# Patient Record
Sex: MALE | Race: WHITE | NOT HISPANIC OR LATINO | URBAN - METROPOLITAN AREA
[De-identification: names, ages, dates, MRNs, and addresses within clinical notes are randomized per-mention and may not be internally consistent; named-entity substitution may affect disease eponyms.]

---

## 2020-07-25 ENCOUNTER — TELEPHONE ENCOUNTER (OUTPATIENT)
Dept: URBAN - METROPOLITAN AREA CLINIC 13 | Facility: CLINIC | Age: 45
End: 2020-07-25

## 2020-07-26 ENCOUNTER — TELEPHONE ENCOUNTER (OUTPATIENT)
Dept: URBAN - METROPOLITAN AREA CLINIC 13 | Facility: CLINIC | Age: 45
End: 2020-07-26

## 2023-01-17 ENCOUNTER — TELEPHONE ENCOUNTER (OUTPATIENT)
Dept: URBAN - METROPOLITAN AREA CLINIC 113 | Facility: CLINIC | Age: 48
End: 2023-01-17

## 2023-01-20 ENCOUNTER — OFFICE VISIT (OUTPATIENT)
Dept: URBAN - METROPOLITAN AREA CLINIC 113 | Facility: CLINIC | Age: 48
End: 2023-01-20
Payer: COMMERCIAL

## 2023-01-20 ENCOUNTER — WEB ENCOUNTER (OUTPATIENT)
Dept: URBAN - METROPOLITAN AREA CLINIC 113 | Facility: CLINIC | Age: 48
End: 2023-01-20

## 2023-01-20 ENCOUNTER — TELEPHONE ENCOUNTER (OUTPATIENT)
Dept: URBAN - METROPOLITAN AREA CLINIC 113 | Facility: CLINIC | Age: 48
End: 2023-01-20

## 2023-01-20 VITALS
BODY MASS INDEX: 42.66 KG/M2 | HEIGHT: 72 IN | WEIGHT: 315 LBS | DIASTOLIC BLOOD PRESSURE: 85 MMHG | SYSTOLIC BLOOD PRESSURE: 138 MMHG | TEMPERATURE: 98 F | HEART RATE: 89 BPM

## 2023-01-20 DIAGNOSIS — E66.01 MORBID (SEVERE) OBESITY DUE TO EXCESS CALORIES: ICD-10-CM

## 2023-01-20 DIAGNOSIS — K75.81 NASH (NONALCOHOLIC STEATOHEPATITIS): ICD-10-CM

## 2023-01-20 DIAGNOSIS — K52.9 COLITIS: ICD-10-CM

## 2023-01-20 PROCEDURE — 99244 OFF/OP CNSLTJ NEW/EST MOD 40: CPT | Performed by: INTERNAL MEDICINE

## 2023-01-20 PROCEDURE — 99204 OFFICE O/P NEW MOD 45 MIN: CPT | Performed by: INTERNAL MEDICINE

## 2023-01-20 RX ORDER — LOSARTAN POTASSIUM 100 MG/1
1 TABLET TABLET ORAL ONCE A DAY
Status: ACTIVE | COMMUNITY

## 2023-01-20 RX ORDER — CHOLESTYRAMINE 4 G/9G
1 PACKET MIXED WITH WATER OR NON-CARBONATED DRINK POWDER, FOR SUSPENSION ORAL ONCE A DAY
Status: ACTIVE | COMMUNITY

## 2023-01-20 NOTE — HPI-TODAY'S VISIT:
47-year-old male presenting for follow-up.  He was seen in our clinic previously for elevated liver enzymes and hepatic steatosis. He did recently have labwork and imaging performed last primary care physician.  He had an abdominal ultrasound with [facility] Wave which demonstrated an enlarged liver with diffuse echotexture.  His exam was limited secondary to body habitus. Platelet count is 104.  His hemoglobin is currently normal.  He does have an elevated AST of 70. She also had a CT scan performed in November 2022 for abdominal cramping.  He was found to have questionable distal descending colon and proximal sigmoid colonic thickening.  There was a question for descending and sigmoid colitis. He did have a fibrosis score of 0.58 and steatosis score of 0.36. He was feeling ill after takin Ozempic. He was scheduled for an EGD and colonoscopy but was canceled. He has not had any ETOH since October 2022.

## 2023-02-15 ENCOUNTER — WEB ENCOUNTER (OUTPATIENT)
Dept: URBAN - METROPOLITAN AREA CLINIC 113 | Facility: CLINIC | Age: 48
End: 2023-02-15

## 2023-02-16 ENCOUNTER — TELEPHONE ENCOUNTER (OUTPATIENT)
Dept: URBAN - METROPOLITAN AREA CLINIC 113 | Facility: CLINIC | Age: 48
End: 2023-02-16

## 2023-02-16 RX ORDER — SODIUM PICOSULFATE, MAGNESIUM OXIDE, AND ANHYDROUS CITRIC ACID 10; 3.5; 12 MG/160ML; G/160ML; G/160ML
160 ML LIQUID ORAL
Qty: 320 MILLILITER | Refills: 0 | OUTPATIENT
Start: 2023-02-16 | End: 2023-02-17

## 2023-02-16 RX ORDER — LOSARTAN POTASSIUM 100 MG/1
1 TABLET TABLET ORAL ONCE A DAY
Status: ACTIVE | COMMUNITY

## 2023-02-16 RX ORDER — CHOLESTYRAMINE 4 G/9G
1 PACKET MIXED WITH WATER OR NON-CARBONATED DRINK POWDER, FOR SUSPENSION ORAL ONCE A DAY
Status: ACTIVE | COMMUNITY

## 2023-02-21 ENCOUNTER — OFFICE VISIT (OUTPATIENT)
Dept: URBAN - METROPOLITAN AREA MEDICAL CENTER 2 | Facility: MEDICAL CENTER | Age: 48
End: 2023-02-21
Payer: COMMERCIAL

## 2023-02-21 DIAGNOSIS — R93.3 ABN FINDINGS-GI TRACT: ICD-10-CM

## 2023-02-21 DIAGNOSIS — D12.4 ADENOMA OF DESCENDING COLON: ICD-10-CM

## 2023-02-21 PROCEDURE — 45385 COLONOSCOPY W/LESION REMOVAL: CPT | Performed by: INTERNAL MEDICINE

## 2023-02-21 RX ORDER — CHOLESTYRAMINE 4 G/9G
1 PACKET MIXED WITH WATER OR NON-CARBONATED DRINK POWDER, FOR SUSPENSION ORAL ONCE A DAY
Status: ACTIVE | COMMUNITY

## 2023-02-21 RX ORDER — LOSARTAN POTASSIUM 100 MG/1
1 TABLET TABLET ORAL ONCE A DAY
Status: ACTIVE | COMMUNITY

## 2023-03-08 ENCOUNTER — OFFICE VISIT (OUTPATIENT)
Dept: URBAN - METROPOLITAN AREA CLINIC 113 | Facility: CLINIC | Age: 48
End: 2023-03-08
Payer: COMMERCIAL

## 2023-03-08 ENCOUNTER — TELEPHONE ENCOUNTER (OUTPATIENT)
Dept: URBAN - METROPOLITAN AREA CLINIC 113 | Facility: CLINIC | Age: 48
End: 2023-03-08

## 2023-03-08 ENCOUNTER — WEB ENCOUNTER (OUTPATIENT)
Dept: URBAN - METROPOLITAN AREA CLINIC 113 | Facility: CLINIC | Age: 48
End: 2023-03-08

## 2023-03-08 ENCOUNTER — OFFICE VISIT (OUTPATIENT)
Dept: URBAN - METROPOLITAN AREA SURGERY CENTER 25 | Facility: SURGERY CENTER | Age: 48
End: 2023-03-08

## 2023-03-08 VITALS
HEART RATE: 84 BPM | TEMPERATURE: 97.8 F | BODY MASS INDEX: 42.66 KG/M2 | HEIGHT: 72 IN | WEIGHT: 315 LBS | DIASTOLIC BLOOD PRESSURE: 84 MMHG | RESPIRATION RATE: 18 BRPM | SYSTOLIC BLOOD PRESSURE: 168 MMHG

## 2023-03-08 DIAGNOSIS — K75.81 NASH (NONALCOHOLIC STEATOHEPATITIS): ICD-10-CM

## 2023-03-08 DIAGNOSIS — K52.9 COLITIS: ICD-10-CM

## 2023-03-08 DIAGNOSIS — E66.01 MORBID (SEVERE) OBESITY DUE TO EXCESS CALORIES: ICD-10-CM

## 2023-03-08 DIAGNOSIS — D12.6 TUBULAR ADENOMA OF COLON: ICD-10-CM

## 2023-03-08 PROBLEM — 408512008: Status: ACTIVE | Noted: 2023-01-20

## 2023-03-08 PROBLEM — 442685003: Status: ACTIVE | Noted: 2023-01-20

## 2023-03-08 PROBLEM — 83911000119104: Status: ACTIVE | Noted: 2023-01-20

## 2023-03-08 PROCEDURE — 99214 OFFICE O/P EST MOD 30 MIN: CPT

## 2023-03-08 RX ORDER — LOSARTAN POTASSIUM 100 MG/1
1 TABLET TABLET ORAL ONCE A DAY
Status: ACTIVE | COMMUNITY

## 2023-03-08 RX ORDER — CHOLESTYRAMINE 4 G/9G
1 PACKET MIXED WITH WATER OR NON-CARBONATED DRINK POWDER, FOR SUSPENSION ORAL ONCE A DAY
Status: ACTIVE | COMMUNITY

## 2023-03-08 NOTE — HPI-TODAY'S VISIT:
47-year-old male presents for follow-up.  He was last seen on 1/20/2023.  There was concern for progressive liver disease given his thrombocytopenia and history of fibrosis.  He was recommended a liver biopsy.  He did also have colitis seen on recent CT scan.  He was recommended a colonoscopy.  Colonoscopy 2/21/2023:Performed without difficulty.  BBPS score of 7.  Grade 2 nonbleeding internal hemorrhoids.  Normal mucosa throughout the entire colon.  Removal of four 6 to 8 mm tubular adenomas in the descending colon.  Distal rectum and anal verge are normal.  Repeat in 3 years for surveillance. We do not have liver biopsy for review.   He states he was miserable on Ozempic. THis causes abdominal pain, nausea, vomiting and diarrhea. He stopped the medication on his own accord. He has not had symptoms since then. He admits to inconsistent stool form. His stool "breaks off" and has to wipe often. His stools are otherwise normal. Denies diarrhea. He has had small volume blood per rectum on the toilet commode a few months ago. He takes Cholestyramine for his bowels after his cholecystectomy. He has yet to perform the liver biopsy. He has a bachelor party for his son and his wedding in April. He would like to schedule this afterward. He understands he should drink ETOH.   1/20/2023: 47-year-old male presenting for follow-up.  He was seen in our clinic previously for elevated liver enzymes and hepatic steatosis. He did recently have lab work and imaging performed last primary care physician.  He had an abdominal ultrasound with Shear Wave which demonstrated an enlarged liver with diffuse echotexture.  His exam was limited secondary to body habitus. Platelet count is 104.  His hemoglobin is currently normal.  He does have an elevated AST of 70. She also had a CT scan performed in November 2022 for abdominal cramping.  He was found to have questionable distal descending colon and proximal sigmoid colonic thickening.  There was a question for descending and sigmoid colitis. He did have a fibrosis score of 0.58 and steatosis score of 0.36. He was feeling ill after taking Ozempic. He was scheduled for an EGD and colonoscopy but was canceled. He has not had any ETOH since October 2022.

## 2023-03-21 ENCOUNTER — OFFICE VISIT (OUTPATIENT)
Dept: URBAN - METROPOLITAN AREA CLINIC 113 | Facility: CLINIC | Age: 48
End: 2023-03-21

## 2023-06-06 ENCOUNTER — TELEPHONE ENCOUNTER (OUTPATIENT)
Dept: URBAN - METROPOLITAN AREA CLINIC 113 | Facility: CLINIC | Age: 48
End: 2023-06-06

## 2023-06-09 ENCOUNTER — OFFICE VISIT (OUTPATIENT)
Dept: URBAN - METROPOLITAN AREA CLINIC 113 | Facility: CLINIC | Age: 48
End: 2023-06-09

## 2023-06-16 ENCOUNTER — DASHBOARD ENCOUNTERS (OUTPATIENT)
Age: 48
End: 2023-06-16

## 2023-06-16 ENCOUNTER — WEB ENCOUNTER (OUTPATIENT)
Dept: URBAN - METROPOLITAN AREA CLINIC 113 | Facility: CLINIC | Age: 48
End: 2023-06-16

## 2023-06-16 ENCOUNTER — OFFICE VISIT (OUTPATIENT)
Dept: URBAN - METROPOLITAN AREA CLINIC 113 | Facility: CLINIC | Age: 48
End: 2023-06-16
Payer: COMMERCIAL

## 2023-06-16 VITALS
RESPIRATION RATE: 18 BRPM | HEART RATE: 84 BPM | DIASTOLIC BLOOD PRESSURE: 91 MMHG | BODY MASS INDEX: 42.66 KG/M2 | WEIGHT: 315 LBS | TEMPERATURE: 97.5 F | SYSTOLIC BLOOD PRESSURE: 147 MMHG | HEIGHT: 72 IN

## 2023-06-16 DIAGNOSIS — K52.89 (LYMPHOCYTIC) MICROSCOPIC COLITIS: ICD-10-CM

## 2023-06-16 DIAGNOSIS — D12.6 TUBULAR ADENOMA OF COLON: ICD-10-CM

## 2023-06-16 DIAGNOSIS — K75.81 NASH (NONALCOHOLIC STEATOHEPATITIS): ICD-10-CM

## 2023-06-16 DIAGNOSIS — K74.69 OTHER CIRRHOSIS OF LIVER: ICD-10-CM

## 2023-06-16 DIAGNOSIS — E66.01 MORBID (SEVERE) OBESITY DUE TO EXCESS CALORIES: ICD-10-CM

## 2023-06-16 PROBLEM — 19943007: Status: ACTIVE | Noted: 2023-06-16

## 2023-06-16 PROCEDURE — 99214 OFFICE O/P EST MOD 30 MIN: CPT | Performed by: INTERNAL MEDICINE

## 2023-06-16 RX ORDER — LOSARTAN POTASSIUM 100 MG/1
1 TABLET TABLET ORAL ONCE A DAY
Status: ACTIVE | COMMUNITY

## 2023-06-16 RX ORDER — CHOLESTYRAMINE 4 G/9G
1 PACKET MIXED WITH WATER OR NON-CARBONATED DRINK POWDER, FOR SUSPENSION ORAL ONCE A DAY
Status: ACTIVE | COMMUNITY

## 2023-06-16 NOTE — HPI-TODAY'S VISIT:
49 y/o male presenting for f/u. He was last seen in March 2023. He has a hx of fatty liver and liver bx shows cirrhosis. He is not drinking ETOH. He has not drank since 2022.  Patient is without any abdominal complaints. No dysphagia, heartburn, regurgitation, unintentional weight loss, nausea, vomiting, hematemesis or melena. Bowels are moving regularly without blood per rectum. No complaints of bloating. No jaundice, icterus.  3/8/23 47-year-old male presents for follow-up.  He was last seen on 1/20/2023.  There was concern for progressive liver disease given his thrombocytopenia and history of fibrosis.  He was recommended a liver biopsy.  He did also have colitis seen on recent CT scan.  He was recommended a colonoscopy.  Colonoscopy 2/21/2023:Performed without difficulty.  BBPS score of 7.  Grade 2 nonbleeding internal hemorrhoids.  Normal mucosa throughout the entire colon.  Removal of four 6 to 8 mm tubular adenomas in the descending colon.  Distal rectum and anal verge are normal.  Repeat in 3 years for surveillance. We do not have liver biopsy for review.   He states he was miserable on Ozempic. THis causes abdominal pain, nausea, vomiting and diarrhea. He stopped the medication on his own accord. He has not had symptoms since then. He admits to inconsistent stool form. His stool "breaks off" and has to wipe often. His stools are otherwise normal. Denies diarrhea. He has had small volume blood per rectum on the toilet commode a few months ago. He takes Cholestyramine for his bowels after his cholecystectomy. He has yet to perform the liver biopsy. He has a bachelor party for his son and his wedding in April. He would like to schedule this afterward. He understands he should drink ETOH.   1/20/2023: 47-year-old male presenting for follow-up.  He was seen in our clinic previously for elevated liver enzymes and hepatic steatosis. He did recently have lab work and imaging performed last primary care physician.  He had an abdominal ultrasound with Shear Wave which demonstrated an enlarged liver with diffuse echotexture.  His exam was limited secondary to body habitus. Platelet count is 104.  His hemoglobin is currently normal.  He does have an elevated AST of 70. She also had a CT scan performed in November 2022 for abdominal cramping.  He was found to have questionable distal descending colon and proximal sigmoid colonic thickening.  There was a question for descending and sigmoid colitis. He did have a fibrosis score of 0.58 and steatosis score of 0.36. He was feeling ill after taking Ozempic. He was scheduled for an EGD and colonoscopy but was canceled. He has not had any ETOH since October 2022.

## 2023-06-19 LAB
A/G RATIO: 1.2
ABSOLUTE BASOPHILS: 18
ABSOLUTE EOSINOPHILS: 142
ABSOLUTE LYMPHOCYTES: 1322
ABSOLUTE MONOCYTES: 425
ABSOLUTE NEUTROPHILS: 3994
AFP, SERUM, TUMOR MARKER: 4.8
ALBUMIN: 3.6
ALKALINE PHOSPHATASE: 108
ALT (SGPT): 31
AST (SGOT): 38
BASOPHILS: 0.3
BILIRUBIN, TOTAL: 0.7
BUN/CREATININE RATIO: 14
BUN: 7
CALCIUM: 9
CARBON DIOXIDE, TOTAL: 29
CHLORIDE: 104
CREATININE: 0.5
EGFR: 127
EOSINOPHILS: 2.4
GLOBULIN, TOTAL: 3.1
GLUCOSE: 110
HEMATOCRIT: 41.5
HEMOGLOBIN: 14.5
INR: 1.1
LYMPHOCYTES: 22.4
MCH: 33.2
MCHC: 34.9
MCV: 95
MONOCYTES: 7.2
MPV: 10.2
NEUTROPHILS: 67.7
PLATELET COUNT: 159
POTASSIUM: 3.7
PROTEIN, TOTAL: 6.7
PT: 11.6
RDW: 12.3
RED BLOOD CELL COUNT: 4.37
SODIUM: 139
WHITE BLOOD CELL COUNT: 5.9

## 2023-07-12 ENCOUNTER — OFFICE VISIT (OUTPATIENT)
Dept: URBAN - METROPOLITAN AREA MEDICAL CENTER 2 | Facility: MEDICAL CENTER | Age: 48
End: 2023-07-12

## 2023-07-12 RX ORDER — LOSARTAN POTASSIUM 100 MG/1
1 TABLET TABLET ORAL ONCE A DAY
Status: ACTIVE | COMMUNITY

## 2023-07-12 RX ORDER — CHOLESTYRAMINE 4 G/9G
1 PACKET MIXED WITH WATER OR NON-CARBONATED DRINK POWDER, FOR SUSPENSION ORAL ONCE A DAY
Status: ACTIVE | COMMUNITY

## 2023-07-31 ENCOUNTER — OFFICE VISIT (OUTPATIENT)
Dept: URBAN - METROPOLITAN AREA CLINIC 113 | Facility: CLINIC | Age: 48
End: 2023-07-31

## 2023-08-17 ENCOUNTER — OFFICE VISIT (OUTPATIENT)
Dept: URBAN - METROPOLITAN AREA CLINIC 113 | Facility: CLINIC | Age: 48
End: 2023-08-17